# Patient Record
Sex: FEMALE | Race: BLACK OR AFRICAN AMERICAN | NOT HISPANIC OR LATINO | URBAN - METROPOLITAN AREA
[De-identification: names, ages, dates, MRNs, and addresses within clinical notes are randomized per-mention and may not be internally consistent; named-entity substitution may affect disease eponyms.]

---

## 2018-04-23 ENCOUNTER — EMERGENCY (EMERGENCY)
Facility: HOSPITAL | Age: 43
LOS: 1 days | Discharge: HOME | End: 2018-04-23
Admitting: EMERGENCY MEDICINE
Payer: COMMERCIAL

## 2018-04-23 VITALS
HEART RATE: 93 BPM | RESPIRATION RATE: 18 BRPM | DIASTOLIC BLOOD PRESSURE: 99 MMHG | WEIGHT: 160.06 LBS | TEMPERATURE: 99 F | OXYGEN SATURATION: 100 % | SYSTOLIC BLOOD PRESSURE: 157 MMHG

## 2018-04-23 DIAGNOSIS — H53.8 OTHER VISUAL DISTURBANCES: ICD-10-CM

## 2018-04-23 DIAGNOSIS — H57.13 OCULAR PAIN, BILATERAL: ICD-10-CM

## 2018-04-23 PROCEDURE — 99282 EMERGENCY DEPT VISIT SF MDM: CPT

## 2018-04-23 NOTE — ED ADULT TRIAGE NOTE - CHIEF COMPLAINT QUOTE
patient complains of eye swelling and and pain for the last 3 weeks,. she states that she saw 2 opthomologists and they did not tell her anything bsides that there is no damage to the optic nerve

## 2018-04-23 NOTE — ED PROVIDER NOTE - OBJECTIVE STATEMENT
43 y/o female with no PMHx presents with having a sensation of dryness and pressure to her eyes b/l x2 months. Pt reports she has seen 2 ophthalmologists who advised her she has a pressure of 22 mmHg in each eye. Pt reports she was given oasis drops and used it as needed only and was given another type of eye drops (unknown name by the second provider) but it caused some blurriness, therefore she only used it once. Pt states last ophthalmologists appointment was 2 days ago and pt is here, because she feels her pressure is increasing now with swelling around her lids and under her eyes. She denies the following: redness, dc, itching, irritation, trauma, floaters, pain, contacts/eye glass wear, fam hx of vision issues, glaucoma, and no hx of smoking, use of drugs, new make-up products, or animals.

## 2018-04-23 NOTE — ED ADULT NURSE NOTE - OBJECTIVE STATEMENT
Patient is a 41yo female reporting several weeks of eye pain and dryness. Patient reports that she has been to 2 different eye doctors who tell her that "my eye pressure keeps increasing." Denies injury, headaches. Reports associated blurry vision bilaterally.

## 2018-04-23 NOTE — ED PROVIDER NOTE - MEDICAL DECISION MAKING DETAILS
consulted with dr. torres - pt required to fu with [facility] 41 y/o female c/o increasing pressure to her eye, with sometimes blurred vision and swelling around eyes. PE: no swelling, redness, dc, magda with eom intact. visual acuity noted in PE. [facility] tonometry with 17 mmHg of right eye and 22 mmHg of the left eye. Consulted with Dr. Peoples, not concerned for acute angle closure glaucoma, possible symptoms may be due to now requiring use of eye glasses. Advised to continue hydration drops and will fu with Dr. Peoples or Dr. Tamez (pt's ophthalmologist).

## 2025-06-05 NOTE — ED ADULT TRIAGE NOTE - MODE OF ARRIVAL
Physical Therapy Treatment Note     Patient Name: Keiry Kline  MRN: [id number]  Today's Date: 6/5/2025     Time Calculation  Start Time: 1445  Stop Time: 1530  Time Calculation (min): 45 min  PT Therapeutic Procedures Time Entry  Therapeutic Exercise Time Entry: 30  Therapeutic Activity Time Entry: 15    Visit: 11/MN this year,  10 for Rt Knee  Referred by:  Carlitos Gallagher MD, Referring Provider  Referral#:  [id number]     [facility] 20[facility] coins, oop $4500 $0 copay. No auth  Certification Dates:  4-23-25 to 7-2-25  Current Problem:   Problem List Items Addressed This Visit           ICD-10-CM       Musculoskeletal and Injuries    Primary osteoarthritis of both knees M17.0     Other Visit Diagnoses         Codes      Post-operative pain     G89.18          1. Post-operative pain  Follow Up In Physical Therapy      2. Primary osteoarthritis of both knees  Follow Up In Physical Therapy         Surgery:  Rt TKR, 3-28-25  HPI:  Degenerative, primary OA B knees, genu varus B       Subjective:  Pt stated she was a little sore after increased PRE last visit.        Patient's Living Environment/PLOF: Independent with all ADL's prior to current injury, lives with spouse who is a olivera.  She works as a  for [employer].  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      Home Living: No concerns, lives in split level, has double railing on all stairs. One step into house, has railing to use  Patient's Therapy Goals: To decrease pain, return to PLOF     Pain:  Intensity:  1-2/10, surgical             Location: Rt knee, diffuse, Lt TKR healed with keloid after             Duration:  intermittent            Aggravating Factor: prolonged WB, positional, end ranges            Alleviating Factor:  Pain meds, rest, ice            Clinical Progression: Gradually improving     Social Factors:  1 personal factor (FT student &/1-2  comorbidities (HTN, OA Rt knee)     Precautions:  WBAT     Objective     Swelling/Girth:  per visual inspection, mild through Rt knee  Palpation:  minimal to none tenderness to palpation    HEP Compliance:  100%  Transfers:  use of hands to push up from low surfaces    Outcome Measures:  LEFS: 33/80, 41.25% (Eval), PN 51/80,   63.75%    Treatments:     Access Code: [email or web address]    Rt Knee AROM:  0-125 with overpressure, pain at end range  Lt Knee AROM:  0-122 with overpressure pain at end range     Rt Hamstrings:  -8  Lt Hamstrings:  -18    Exercises    - LAQ's,  3 x 10, 3 lbs  - SLS,  3 x 15 sec hold  - Total Gym Squats, 3 x 10   - Bike, Level 1, 5 Min    Heel Slides, 10 x 5 sec, B    TKE, 70 lbs, 3 sec hold    Hamstring Curls, 3 lbs, B    Rockerboard, 20 x    Gastroc/Soleus Stretch, 3 x 30        Therapeutic Activity      3 way, Sit to Std, 2 x 10    6 inch step ups, 2 x 15 B    Inchworms, 3 lbs, B, 2 x 25 feet    Reverse Lunges, 10 x each    Hip Ext, Add, B., 50 lbs, 2 x 15    Hip Abd, 40 lbs, 2 x 15    Inchworms & W's, 3 lbs, 2 x 25 feet each    Assessment:     Pt is progressing w/o issues, just c/o's of stiffness with prolonged postures, mornings and some pain at end ranges.  She is progressing towards all goals.      Plan: Continue per POC & pt tolerance.  Frequency/duration: 2x/wk for 12 visits                                                                               Walk in